# Patient Record
Sex: MALE | Race: ASIAN | URBAN - METROPOLITAN AREA
[De-identification: names, ages, dates, MRNs, and addresses within clinical notes are randomized per-mention and may not be internally consistent; named-entity substitution may affect disease eponyms.]

---

## 2022-06-27 ENCOUNTER — OFFICE VISIT (OUTPATIENT)
Dept: URBAN - METROPOLITAN AREA CLINIC 82 | Facility: CLINIC | Age: 62
End: 2022-06-27
Payer: COMMERCIAL

## 2022-06-27 ENCOUNTER — WEB ENCOUNTER (OUTPATIENT)
Dept: URBAN - METROPOLITAN AREA CLINIC 82 | Facility: CLINIC | Age: 62
End: 2022-06-27

## 2022-06-27 VITALS
HEIGHT: 65 IN | BODY MASS INDEX: 20.58 KG/M2 | HEART RATE: 90 BPM | WEIGHT: 123.5 LBS | DIASTOLIC BLOOD PRESSURE: 65 MMHG | TEMPERATURE: 97.8 F | SYSTOLIC BLOOD PRESSURE: 100 MMHG

## 2022-06-27 DIAGNOSIS — R19.4 CHANGE IN BOWEL HABIT: ICD-10-CM

## 2022-06-27 DIAGNOSIS — Z83.79 FAMILY HISTORY OF CIRRHOSIS OF LIVER: ICD-10-CM

## 2022-06-27 PROBLEM — 129851009: Status: ACTIVE | Noted: 2022-06-27

## 2022-06-27 PROCEDURE — 99203 OFFICE O/P NEW LOW 30 MIN: CPT | Performed by: INTERNAL MEDICINE

## 2022-06-27 NOTE — HPI-TODAY'S VISIT:
last 6 months, problem of digestion, eat something suddenly, at work pressure for toilet, bm, no pain   bm softner, no hard.   eat good, eating capacity is reduced. no n/v  6 months ago bm normal.   pcp did us and everything was oka  eat and run to bm, bm 2 times a day  no bleeding p/r  lost wt , 10 llbs, 2-3 years  mental stress.   no heart or lung  no smoking , once in while  no fhcc/  colonscopy done last nov, dr rose  MOTHER AND BROTHER LIVER PROBLEM

## 2022-06-28 LAB
ALBUMIN: 4.7
ALKALINE PHOSPHATASE: 56
ALT (SGPT): 19
AST (SGOT): 19
BILIRUBIN, DIRECT: 0.13
BILIRUBIN, TOTAL: 0.6
HBSAG SCREEN: NEGATIVE
HEP C VIRUS AB: 0.2
PROTEIN, TOTAL: 7.5

## 2022-07-18 ENCOUNTER — DASHBOARD ENCOUNTERS (OUTPATIENT)
Age: 62
End: 2022-07-18

## 2022-08-15 ENCOUNTER — OFFICE VISIT (OUTPATIENT)
Dept: URBAN - METROPOLITAN AREA CLINIC 82 | Facility: CLINIC | Age: 62
End: 2022-08-15